# Patient Record
Sex: FEMALE | Race: WHITE | NOT HISPANIC OR LATINO | Employment: FULL TIME | URBAN - METROPOLITAN AREA
[De-identification: names, ages, dates, MRNs, and addresses within clinical notes are randomized per-mention and may not be internally consistent; named-entity substitution may affect disease eponyms.]

---

## 2017-01-09 ENCOUNTER — TRANSCRIBE ORDERS (OUTPATIENT)
Dept: ADMINISTRATIVE | Facility: HOSPITAL | Age: 47
End: 2017-01-09

## 2017-01-09 DIAGNOSIS — Z12.31 VISIT FOR SCREENING MAMMOGRAM: Primary | ICD-10-CM

## 2017-04-06 ENCOUNTER — APPOINTMENT (OUTPATIENT)
Dept: MAMMOGRAPHY | Facility: HOSPITAL | Age: 47
End: 2017-04-06
Attending: OBSTETRICS & GYNECOLOGY

## 2017-04-11 ENCOUNTER — HOSPITAL ENCOUNTER (OUTPATIENT)
Dept: MAMMOGRAPHY | Facility: HOSPITAL | Age: 47
Discharge: HOME | End: 2017-04-11
Attending: OBSTETRICS & GYNECOLOGY | Admitting: OBSTETRICS & GYNECOLOGY

## 2017-04-11 DIAGNOSIS — Z12.31 VISIT FOR SCREENING MAMMOGRAM: ICD-10-CM

## 2017-04-11 PROCEDURE — 77063 BREAST TOMOSYNTHESIS BI: CPT

## 2017-04-11 PROCEDURE — 77067 SCR MAMMO BI INCL CAD: CPT | Performed by: RADIOLOGY

## 2017-04-11 PROCEDURE — 77063 BREAST TOMOSYNTHESIS BI: CPT | Performed by: RADIOLOGY

## 2017-04-11 PROCEDURE — G0202 SCR MAMMO BI INCL CAD: HCPCS

## 2017-08-29 ENCOUNTER — OFFICE VISIT (OUTPATIENT)
Dept: OBSTETRICS AND GYNECOLOGY | Facility: CLINIC | Age: 47
End: 2017-08-29

## 2017-08-29 VITALS
HEIGHT: 64 IN | SYSTOLIC BLOOD PRESSURE: 116 MMHG | WEIGHT: 160 LBS | DIASTOLIC BLOOD PRESSURE: 70 MMHG | BODY MASS INDEX: 27.31 KG/M2

## 2017-08-29 DIAGNOSIS — Z01.419 WOMEN'S ANNUAL ROUTINE GYNECOLOGICAL EXAMINATION: Primary | ICD-10-CM

## 2017-08-29 PROBLEM — Z98.890 S/P ABDOMINOPLASTY: Status: ACTIVE | Noted: 2017-08-29

## 2017-08-29 PROBLEM — Z98.891 S/P CESAREAN SECTION: Status: ACTIVE | Noted: 2017-08-29

## 2017-08-29 PROCEDURE — 99396 PREV VISIT EST AGE 40-64: CPT | Performed by: OBSTETRICS & GYNECOLOGY

## 2017-08-29 RX ORDER — MULTIPLE VITAMINS W/ MINERALS TAB 9MG-400MCG
1 TAB ORAL DAILY
COMMUNITY

## 2017-08-29 NOTE — PROGRESS NOTES
"Subjective   Chief Complaint   Patient presents with   • Annual Exam     Nellie Camacho is a 46 y.o. year old  presenting to be seen for her annual exam.    Current birth control method: tubal ligation.She is doing well.  Her son Eduard's on the academic team and playing golf.  Now 11 years old.    Patient's last menstrual period was 2017.    She is sexually active.   Condoms are not typically used.      Past 6 month menstrual history:    Cycle Frequency: vary between 26 and 28 days   Menstrual cycle character: flow is typically normal   Cycle Duration: 4 - 6   Number of heavy days of flows: 2   Intermenstrual bleeding present: {no   Post-coital bleeding present: no     She exercises regularly: yes.  Calcium intake: no.Yogurt some cheese occasional milk multiple vitamin she is doing well.  Eduard is playing golf is on condem[place] [employer].  She performs self breast exam:yes.  She has concerns about domestic violence: no.    The following portions of the patient's history were reviewed and updated as appropriate:problem list, current medications, allergies, past family history, past medical history, past social history and past surgical history.  Torie answered no to all questions on gynecologic history.  Her medical history review was negative system review is negative except for headaches.  Family history is negative for breast ovarian and colon cancer no osteoporosis.  She is  does not smoke nor drink no illegal drugs.    Review of Systems    normal bladder and bowels  Objective   /70  Ht 63.5\" (161.3 cm)  Wt 160 lb (72.6 kg)  LMP 2017 Comment: 26 cycle days  BMI 27.9 kg/m2     General:  well developed; well nourished  no acute distress  appears stated age   Skin:  No suspicious lesions seen   Thyroid: normal to inspection and palpation   Breasts:  Examined in supine position  Symmetric without masses or skin dimpling  Nipples normal without inversion, lesions or discharge  There are " no palpable axillary nodes   Abdomen: soft, non-tender; no masses  no umbilical or inginual hernias are present  no hepato-splenomegaly   Pelvis: Clinical staff was present for exam  External genitalia:  normal appearance of the external genitalia including Bartholin's and [place]'s glands.  :  urethral meatus normal;  Uterus:  normal size, shape and consistency.  Adnexa:  normal bimanual exam of the adnexa.  Rectal:  digital rectal exam not performed; anus visually normal appearing.     Lab Review   No data reviewed    Imaging  Mammogram report       Assessment   1. Normal GYN examination  2. May need a little more calcium in her diet to prevent osteoporosis  3. Normal mammogram April 2017  4. Normal Pap co-test May 2016     Plan   1. Continue self breast awareness, exercise  We'll give calcium diet information  If she would like she could schedule her mammogram on the same day of her appointment next year.    Medications Rx this encounter:  New Medications Ordered This Visit   Medications   • Multiple Vitamins-Minerals (MULTIVITAMIN WITH MINERALS) tablet tablet     Sig: Take 1 tablet by mouth Daily.          This note was electronically signed.    Bob Su MD  8/29/2017

## 2018-03-05 ENCOUNTER — TRANSCRIBE ORDERS (OUTPATIENT)
Dept: OBSTETRICS AND GYNECOLOGY | Facility: CLINIC | Age: 48
End: 2018-03-05

## 2018-03-05 ENCOUNTER — TRANSCRIBE ORDERS (OUTPATIENT)
Dept: ADMINISTRATIVE | Facility: HOSPITAL | Age: 48
End: 2018-03-05

## 2018-03-05 DIAGNOSIS — Z12.31 VISIT FOR SCREENING MAMMOGRAM: Primary | ICD-10-CM

## 2018-04-24 ENCOUNTER — HOSPITAL ENCOUNTER (OUTPATIENT)
Dept: MAMMOGRAPHY | Facility: HOSPITAL | Age: 48
Discharge: HOME | End: 2018-04-24
Attending: OBSTETRICS & GYNECOLOGY | Admitting: OBSTETRICS & GYNECOLOGY

## 2018-04-24 DIAGNOSIS — Z12.31 VISIT FOR SCREENING MAMMOGRAM: ICD-10-CM

## 2018-04-24 PROCEDURE — 77067 SCR MAMMO BI INCL CAD: CPT | Performed by: RADIOLOGY

## 2018-04-24 PROCEDURE — 77063 BREAST TOMOSYNTHESIS BI: CPT | Performed by: RADIOLOGY

## 2018-04-24 PROCEDURE — 77063 BREAST TOMOSYNTHESIS BI: CPT

## 2018-04-24 PROCEDURE — 77067 SCR MAMMO BI INCL CAD: CPT

## 2019-03-27 ENCOUNTER — TRANSCRIBE ORDERS (OUTPATIENT)
Dept: ADMINISTRATIVE | Facility: HOSPITAL | Age: 49
End: 2019-03-27

## 2019-03-27 DIAGNOSIS — Z12.31 VISIT FOR SCREENING MAMMOGRAM: Primary | ICD-10-CM

## 2019-04-22 ENCOUNTER — OFFICE VISIT (OUTPATIENT)
Dept: OBSTETRICS AND GYNECOLOGY | Facility: CLINIC | Age: 49
End: 2019-04-22

## 2019-04-22 VITALS
HEIGHT: 65 IN | SYSTOLIC BLOOD PRESSURE: 116 MMHG | WEIGHT: 170 LBS | BODY MASS INDEX: 28.32 KG/M2 | DIASTOLIC BLOOD PRESSURE: 70 MMHG

## 2019-04-22 DIAGNOSIS — Z01.419 ENCOUNTER FOR WELL WOMAN EXAM WITH ROUTINE GYNECOLOGICAL EXAM: Primary | ICD-10-CM

## 2019-04-22 PROBLEM — Z98.891 S/P CESAREAN SECTION: Status: RESOLVED | Noted: 2017-08-29 | Resolved: 2019-04-22

## 2019-04-22 PROCEDURE — 99396 PREV VISIT EST AGE 40-64: CPT | Performed by: OBSTETRICS & GYNECOLOGY

## 2019-04-22 NOTE — PROGRESS NOTES
"Subjective   Chief Complaint   Patient presents with   • Annual Exam     Nellie Camacho is a 48 y.o. year old  presenting to be seen for her annual exam.    Current birth control method: Tubal / salpingectomy.  She is noting a lot of warts on legs and left upper arm; right hand were raised - Compound W looks like it is working ; rough skin not raised ?    Her son Larry is doing well and 1 the regional science fair.    Patient's last menstrual period was 04/15/2019.    She is sexually active.   Condoms are not typically used.    [place] is painful or she is having problems :no  She has concerns about domestic violence: no.    Cycle Frequency: regular, predictable and consistent every 28 - 32 days   Menstrual cycle character: flow is typically normal and flow has been heavy some months and light other months   Cycle Duration: 3 - 5--7 mostly light or spotting   Number of heavy days of flows: 3   Intermenstrual bleeding present: no   Post-coital bleeding present: no     She exercises regularly: yes.  Calcium intake: is not adequate.2  Caffeine intake: 3 cups of coffee or equivalent   Self breast awareness:yes.      Social History    Tobacco Use      Smoking status: Never Smoker      Smokeless tobacco: Never Used    Social History     Substance and Sexual Activity   Alcohol Use No    does not drink    The following portions of the patient's history were reviewed and updated as is  appropriate:problem list, current medications, allergies, past family history, past medical history, past social history and past surgical history.    Current Outpatient Medications:   •  Multiple Vitamins-Minerals (MULTIVITAMIN WITH MINERALS) tablet tablet, Take 1 tablet by mouth Daily., Disp: , Rfl:     Review of Systems    normal bladder and bowels  Objective   /70   Ht 164.5 cm (64.75\")   Wt 77.1 kg (170 lb)   LMP 04/15/2019 Comment: btl  Breastfeeding? No   BMI 28.51 kg/m²       General:  well developed; well " nourished  no acute distress  appears stated age   Skin:  No suspicious lesions seen  She has multiple Band-Aids in multiple areas were Compound W type medication has been applied.   Thyroid:    Breasts:  Examined in supine position  Symmetric without masses or skin dimpling  Nipples normal without inversion, lesions or discharge  There are no palpable axillary nodes   Abdomen: soft, non-tender; no masses  no umbilical or inguinal hernias are present  no hepato-splenomegaly   Pelvis: Clinical staff was present for exam  External genitalia:  normal appearance of the external genitalia including Bartholin's and [place]'s glands. There is a benign-appearing mole left lower lateral vulva/buttocks  :  urethral meatus normal; urethral hypermobility is absent.  Vaginal:  normal pink mucosa without prolapse or lesions.  Cervix:  normal appearance. Pap obtained  Uterus:  normal size, shape and consistency.  Adnexa:  normal bimanual exam of the adnexa.       Lab Review   Pap test    Imaging  Mammogram report       Assessment   1. Normal GYN examination.  Pap co-test and caught up-to-date.  Mammogram will be scheduled or done soon after April 24.  2. Consider colonoscopy with new protocol.  3. Decrease caffeine and increase calcium ideally she is exercising.  4. I will defer to primary care doctor regarding whether these are warts or not; if flap wonder if it is some sort of eczema psoriasis sort of lesion.  But not over treat with Compound W.               Plan   1. 1000 mg calcium in divided doses ideally in diet; regular exercise  2. Self breast awareness and mammogram protocols discussed.  3. Annual examination or sooner as needed         No orders of the defined types were placed in this encounter.    No orders of the defined types were placed in this encounter.          This note was electronically signed.    Bob Su MD  4/22/2019

## 2019-05-07 ENCOUNTER — HOSPITAL ENCOUNTER (OUTPATIENT)
Dept: MAMMOGRAPHY | Facility: HOSPITAL | Age: 49
Discharge: HOME | End: 2019-05-07
Admitting: OBSTETRICS & GYNECOLOGY

## 2019-05-07 DIAGNOSIS — Z12.31 VISIT FOR SCREENING MAMMOGRAM: ICD-10-CM

## 2019-05-07 PROCEDURE — 77067 SCR MAMMO BI INCL CAD: CPT

## 2019-05-07 PROCEDURE — 77067 SCR MAMMO BI INCL CAD: CPT | Performed by: RADIOLOGY

## 2019-05-07 PROCEDURE — 77063 BREAST TOMOSYNTHESIS BI: CPT

## 2019-05-07 PROCEDURE — 77063 BREAST TOMOSYNTHESIS BI: CPT | Performed by: RADIOLOGY

## 2020-04-27 ENCOUNTER — TRANSCRIBE ORDERS (OUTPATIENT)
Dept: ADMINISTRATIVE | Facility: HOSPITAL | Age: 50
End: 2020-04-27

## 2020-04-27 DIAGNOSIS — Z12.31 VISIT FOR SCREENING MAMMOGRAM: Primary | ICD-10-CM

## 2020-08-05 ENCOUNTER — APPOINTMENT (OUTPATIENT)
Dept: MAMMOGRAPHY | Facility: HOSPITAL | Age: 50
End: 2020-08-05

## 2020-08-11 ENCOUNTER — OFFICE VISIT (OUTPATIENT)
Dept: OBSTETRICS AND GYNECOLOGY | Facility: CLINIC | Age: 50
End: 2020-08-11

## 2020-08-11 VITALS
WEIGHT: 166 LBS | HEIGHT: 65 IN | SYSTOLIC BLOOD PRESSURE: 116 MMHG | DIASTOLIC BLOOD PRESSURE: 70 MMHG | BODY MASS INDEX: 27.66 KG/M2

## 2020-08-11 DIAGNOSIS — Z01.419 ENCOUNTER FOR GYNECOLOGICAL EXAMINATION WITHOUT ABNORMAL FINDING: Primary | ICD-10-CM

## 2020-08-11 PROBLEM — G43.829 MENSTRUAL MIGRAINE: Status: ACTIVE | Noted: 2020-08-11

## 2020-08-11 PROBLEM — Z98.890 S/P ABDOMINOPLASTY: Status: RESOLVED | Noted: 2017-08-29 | Resolved: 2020-08-11

## 2020-08-11 PROCEDURE — 99396 PREV VISIT EST AGE 40-64: CPT | Performed by: OBSTETRICS & GYNECOLOGY

## 2020-08-11 RX ORDER — ONDANSETRON 4 MG/1
4 TABLET, ORALLY DISINTEGRATING ORAL EVERY 8 HOURS PRN
Qty: 20 TABLET | Refills: 1 | Status: SHIPPED | OUTPATIENT
Start: 2020-08-11 | End: 2021-10-29

## 2020-08-11 NOTE — PROGRESS NOTES
Subjective   Chief Complaint   Patient presents with   • Annual Exam     Nellie Camacho is a 49 y.o. year old  presenting to be seen for her annual exam.    Current birth control method: Tubal / salpingectomy.    Patient's last menstrual period was 2020.    She is sexually active.   Condoms are not typically used.    [place] is painful or she is having problems :no  She has concerns about domestic violence: no.    Cycle Frequency: regular, predictable and consistent every 28 - 32 days   Menstrual cycle character: flow is typically normal and flow is typically moderately heavy   Cycle Duration: 3 - 5   Number of heavy days of flows: 2   Intermenstrual bleeding present: no   Post-coital bleeding present: no     She exercises regularly: yes.  Self breast awareness:yes    Calcium intake: is not adequate.  Caffeine intake: no or mild caffeine use  Social History    Tobacco Use      Smoking status: Never Smoker      Smokeless tobacco: Never Used    Social History     Substance and Sexual Activity   Alcohol Use No        The following portions of the patient's history were reviewed and updated as is  appropriate:problem list, current medications, allergies, past family history, past medical history, past social history and past surgical history.    Current Outpatient Medications:   •  Multiple Vitamins-Minerals (MULTIVITAMIN WITH MINERALS) tablet tablet, Take 1 tablet by mouth Daily., Disp: , Rfl:   •  ondansetron ODT (Zofran ODT) 4 MG disintegrating tablet, Place 1 tablet on the tongue Every 8 (Eight) Hours As Needed for Nausea or Vomiting., Disp: 20 tablet, Rfl: 1    Review of systems  Constitutional    POS headaches                             NEG anorexia, fatigue, fevers or night sweats  Breast                POS nothing reported                            NEG persistent breast lump, skin dimpling or nipple discharge  GI                      POS nothing reported                            NEG  "bloating, change in bowel habits, melena or reflux symptoms                       POS nothing reported                            NEG dysuria or hematuria         Objective   /70   Ht 163.8 cm (64.5\")   Wt 75.3 kg (166 lb)   LMP 07/22/2020   Breastfeeding No   BMI 28.05 kg/m²       General:  well developed; well nourished  no acute distress  appears stated age   Skin:  No suspicious lesions seen   Thyroid:    Breasts:  Examined in supine position  Symmetric without masses or skin dimpling  Nipples normal without inversion, lesions or discharge  Fibrocystic changes are present both breasts without a discrete mass   Abdomen: soft, non-tender; no masses  no umbilical or inguinal hernias are present  no hepato-splenomegaly   Pelvis: Clinical staff was present for exam  External genitalia:  normal appearance of the external genitalia including Bartholin's and [place]'s glands.  :  urethral meatus normal;  Vaginal:  normal pink mucosa without prolapse or lesions.  Uterus:  normal size, shape and consistency. anteverted;  Adnexa:  normal bimanual exam of the adnexa.  Rectal:  digital rectal exam not performed; anus visually normal appearing.       Lab Review   Pap test    Imaging  Mammogram report       Assessment     1. Normal GYN examination with exception of  2. What sounds like menstrual back respiratory control with Tylenol and Motrin if she takes this early occasional nausea vomiting so we will add Zofran  She is up-to-date with mammogram scheduled delayed due to COVID virus Pap smear normal last year           Plan     1. Annual examination or sooner as needed  2. 1000 mg calcium in divided doses ideally in diet; regular exercise  3. Self breast awareness discussed  4.    She will lab work done when she returns about 2 weeks for mammogram            New Medications Ordered This Visit   Medications   • ondansetron ODT (Zofran ODT) 4 MG disintegrating tablet     Sig: Place 1 tablet on the tongue Every 8 " (Eight) Hours As Needed for Nausea or Vomiting.     Dispense:  20 tablet     Refill:  1     Orders Placed This Encounter   Procedures   • Lipid Panel     Standing Status:   Future     Standing Expiration Date:   8/11/2021   • TSH     Standing Status:   Future     Standing Expiration Date:   8/11/2021   • CBC (No Diff)     Standing Status:   Future     Standing Expiration Date:   8/11/2021           This note was electronically signed.    Bob Su MD  8/11/2020

## 2020-08-20 ENCOUNTER — LAB (OUTPATIENT)
Dept: LAB | Facility: HOSPITAL | Age: 50
End: 2020-08-20

## 2020-08-20 ENCOUNTER — HOSPITAL ENCOUNTER (OUTPATIENT)
Dept: MAMMOGRAPHY | Facility: HOSPITAL | Age: 50
Discharge: HOME | End: 2020-08-20
Admitting: OBSTETRICS & GYNECOLOGY

## 2020-08-20 DIAGNOSIS — Z12.31 VISIT FOR SCREENING MAMMOGRAM: ICD-10-CM

## 2020-08-20 DIAGNOSIS — Z01.419 ENCOUNTER FOR GYNECOLOGICAL EXAMINATION WITHOUT ABNORMAL FINDING: ICD-10-CM

## 2020-08-20 LAB
CHOLEST SERPL-MCNC: 194 MG/DL (ref 0–200)
DEPRECATED RDW RBC AUTO: 40.7 FL (ref 37–54)
ERYTHROCYTE [DISTWIDTH] IN BLOOD BY AUTOMATED COUNT: 12.7 % (ref 12.3–15.4)
HCT VFR BLD AUTO: 40 % (ref 34–46.6)
HDLC SERPL-MCNC: 61 MG/DL (ref 40–60)
HGB BLD-MCNC: 13.4 G/DL (ref 12–15.9)
LDLC SERPL CALC-MCNC: 115 MG/DL (ref 0–100)
LDLC/HDLC SERPL: 1.89 {RATIO}
MCH RBC QN AUTO: 29.4 PG (ref 26.6–33)
MCHC RBC AUTO-ENTMCNC: 33.5 G/DL (ref 31.5–35.7)
MCV RBC AUTO: 87.7 FL (ref 79–97)
PLATELET # BLD AUTO: 230 10*3/MM3 (ref 140–450)
PMV BLD AUTO: 10.3 FL (ref 6–12)
RBC # BLD AUTO: 4.56 10*6/MM3 (ref 3.77–5.28)
TRIGL SERPL-MCNC: 90 MG/DL (ref 0–150)
TSH SERPL DL<=0.05 MIU/L-ACNC: 1.17 UIU/ML (ref 0.27–4.2)
VLDLC SERPL-MCNC: 18 MG/DL (ref 5–40)
WBC # BLD AUTO: 5.83 10*3/MM3 (ref 3.4–10.8)

## 2020-08-20 PROCEDURE — 77067 SCR MAMMO BI INCL CAD: CPT

## 2020-08-20 PROCEDURE — 77067 SCR MAMMO BI INCL CAD: CPT | Performed by: RADIOLOGY

## 2020-08-20 PROCEDURE — 84443 ASSAY THYROID STIM HORMONE: CPT

## 2020-08-20 PROCEDURE — 36415 COLL VENOUS BLD VENIPUNCTURE: CPT

## 2020-08-20 PROCEDURE — 80061 LIPID PANEL: CPT

## 2020-08-20 PROCEDURE — 77063 BREAST TOMOSYNTHESIS BI: CPT | Performed by: RADIOLOGY

## 2020-08-20 PROCEDURE — 77063 BREAST TOMOSYNTHESIS BI: CPT

## 2020-08-20 PROCEDURE — 85027 COMPLETE CBC AUTOMATED: CPT

## 2021-10-29 ENCOUNTER — OFFICE VISIT (OUTPATIENT)
Dept: OBSTETRICS AND GYNECOLOGY | Facility: CLINIC | Age: 51
End: 2021-10-29

## 2021-10-29 VITALS
SYSTOLIC BLOOD PRESSURE: 118 MMHG | DIASTOLIC BLOOD PRESSURE: 70 MMHG | BODY MASS INDEX: 30.08 KG/M2 | RESPIRATION RATE: 16 BRPM | WEIGHT: 178 LBS

## 2021-10-29 DIAGNOSIS — Z12.31 ENCOUNTER FOR SCREENING MAMMOGRAM FOR MALIGNANT NEOPLASM OF BREAST: ICD-10-CM

## 2021-10-29 DIAGNOSIS — Z01.419 ENCOUNTER FOR GYNECOLOGICAL EXAMINATION WITHOUT ABNORMAL FINDING: Primary | ICD-10-CM

## 2021-10-29 PROCEDURE — 99396 PREV VISIT EST AGE 40-64: CPT | Performed by: NURSE PRACTITIONER

## 2021-10-29 RX ORDER — ERGOCALCIFEROL 1.25 MG/1
CAPSULE ORAL
COMMUNITY
Start: 2021-10-25

## 2021-10-29 NOTE — PROGRESS NOTES
Annual Visit     Patient Name: Nellie Camacho  : 1970   MRN: [id number]   Care Team: Patient Care Team:  Jennifer Saldivar MD as PCP - General (Family Medicine)    Chief Complaint:    Chief Complaint   Patient presents with   • Annual Exam       HPI: Nellie Camacho is a 50 y.o. year old  presenting to be seen for her gynecologic exam.   S/p tubal/salpingectomy   Monthly periods w/out c/o     Pap smear  WNL and HPV negative     Mammogram 2020 birads 1   Hasn't scheduled imaging for this year yet     Has been struggling to lose weight - exercises 5-6x/wk and monitoring intake with my fitness pal and still no weight loss   Had labs recently with PCP including TSH which was WNL per pt       Subjective      /70   Resp 16   Wt 80.7 kg (178 lb)   LMP 10/18/2021   Breastfeeding No   BMI 30.08 kg/m²     BMI reviewed: Body mass index is 30.08 kg/m².      Objective     Physical Exam    Neuro: alert and oriented to person, place and time   General:  alert; cooperative; well developed; well nourished   Skin:  No suspicious lesions seen   Thyroid: normal to inspection and palpation   Lungs:  breathing is unlabored  clear to auscultation bilaterally   Heart:  regular rate and rhythm, S1, S2 normal, no murmur, click, rub or gallop  normal apical impulse   Breasts:  Examined in supine position  Symmetric without masses or skin dimpling  Nipples normal without inversion, lesions or discharge  There are no palpable axillary nodes   Abdomen: soft, non-tender; no masses  no umbilical or inguinal hernias are present  no hepato-splenomegaly   Pelvis: Clinical staff was present for exam  External genitalia:  normal appearance of the external genitalia including Bartholin's and [place]'s glands.  :  urethral meatus normal;  Vaginal:  normal pink mucosa without prolapse or lesions.  Cervix:  normal appearance.  Uterus:  normal size, shape and consistency.  Adnexa:  non palpable bilaterally.  Rectal:   digital rectal exam not performed; anus visually normal appearing.         Assessment / Plan      Assessment  Problems Addressed This Visit    ICD-10-CM ICD-9-CM   1. Encounter for gynecological examination without abnormal finding  Z01.419 V72.31   2. Encounter for screening mammogram for malignant neoplasm of breast  Z12.31 V76.12       Plan    Discussed monthly SBEs and importance of annual imaging   Mammogram ordered     Pap smear not indicated today     Discussed cont with monitoring intake and exercise   Cont routine labs with PCP     AV 1 yr             Follow Up  Return in about 1 year (around 10/29/2022) for Annual physical.  Patient was given instructions and counseling regarding her condition or for health maintenance advice. Please see specific information pulled into the AVS if appropriate.     Michelle Adame, APRGILBERT  October 29, 2021  15:31 EDT

## 2021-11-18 ENCOUNTER — TRANSCRIBE ORDERS (OUTPATIENT)
Dept: ADMINISTRATIVE | Facility: HOSPITAL | Age: 51
End: 2021-11-18

## 2021-11-18 DIAGNOSIS — Z12.31 VISIT FOR SCREENING MAMMOGRAM: Primary | ICD-10-CM

## 2021-11-22 ENCOUNTER — HOSPITAL ENCOUNTER (OUTPATIENT)
Dept: MAMMOGRAPHY | Facility: HOSPITAL | Age: 51
Discharge: HOME | End: 2021-11-22
Admitting: NURSE PRACTITIONER

## 2021-11-22 DIAGNOSIS — Z12.31 VISIT FOR SCREENING MAMMOGRAM: ICD-10-CM

## 2021-11-22 PROCEDURE — 77063 BREAST TOMOSYNTHESIS BI: CPT | Performed by: RADIOLOGY

## 2021-11-22 PROCEDURE — 77063 BREAST TOMOSYNTHESIS BI: CPT

## 2021-11-22 PROCEDURE — 77067 SCR MAMMO BI INCL CAD: CPT

## 2021-11-22 PROCEDURE — 77067 SCR MAMMO BI INCL CAD: CPT | Performed by: RADIOLOGY

## 2022-07-18 ENCOUNTER — TRANSCRIBE ORDERS (OUTPATIENT)
Dept: OBSTETRICS AND GYNECOLOGY | Facility: CLINIC | Age: 52
End: 2022-07-18

## 2022-07-18 DIAGNOSIS — Z12.31 VISIT FOR SCREENING MAMMOGRAM: Primary | ICD-10-CM

## 2022-11-23 ENCOUNTER — HOSPITAL ENCOUNTER (OUTPATIENT)
Dept: MAMMOGRAPHY | Facility: HOSPITAL | Age: 52
Discharge: HOME | End: 2022-11-23
Admitting: NURSE PRACTITIONER

## 2022-11-23 ENCOUNTER — OFFICE VISIT (OUTPATIENT)
Dept: OBSTETRICS AND GYNECOLOGY | Facility: CLINIC | Age: 52
End: 2022-11-23

## 2022-11-23 VITALS
RESPIRATION RATE: 16 BRPM | SYSTOLIC BLOOD PRESSURE: 118 MMHG | DIASTOLIC BLOOD PRESSURE: 70 MMHG | WEIGHT: 180 LBS | BODY MASS INDEX: 30.42 KG/M2

## 2022-11-23 DIAGNOSIS — Z01.419 ENCOUNTER FOR GYNECOLOGICAL EXAMINATION WITHOUT ABNORMAL FINDING: Primary | ICD-10-CM

## 2022-11-23 DIAGNOSIS — Z12.31 VISIT FOR SCREENING MAMMOGRAM: ICD-10-CM

## 2022-11-23 DIAGNOSIS — Z12.4 PAP SMEAR FOR CERVICAL CANCER SCREENING: ICD-10-CM

## 2022-11-23 DIAGNOSIS — N60.12 FIBROCYSTIC BREAST CHANGES, BILATERAL: ICD-10-CM

## 2022-11-23 DIAGNOSIS — Z12.11 SCREENING FOR MALIGNANT NEOPLASM OF COLON: ICD-10-CM

## 2022-11-23 DIAGNOSIS — N60.11 FIBROCYSTIC BREAST CHANGES, BILATERAL: ICD-10-CM

## 2022-11-23 PROCEDURE — 77067 SCR MAMMO BI INCL CAD: CPT | Performed by: RADIOLOGY

## 2022-11-23 PROCEDURE — 99396 PREV VISIT EST AGE 40-64: CPT | Performed by: NURSE PRACTITIONER

## 2022-11-23 PROCEDURE — 77063 BREAST TOMOSYNTHESIS BI: CPT | Performed by: RADIOLOGY

## 2022-11-23 PROCEDURE — 77067 SCR MAMMO BI INCL CAD: CPT

## 2022-11-23 PROCEDURE — 77063 BREAST TOMOSYNTHESIS BI: CPT

## 2022-11-23 RX ORDER — HYDROCHLOROTHIAZIDE 12.5 MG/1
TABLET ORAL
COMMUNITY
Start: 2022-11-15

## 2022-11-23 NOTE — PROGRESS NOTES
Annual Visit     Patient Name: Nellie Camacho  : 1970   MRN: [id number]   Care Team: Patient Care Team:  Jennifer Saldivar MD as PCP - General (Family Medicine)    Chief Complaint:    Chief Complaint   Patient presents with   • Annual Exam       HPI: Nellie Camacho is a 52 y.o. year old  presenting to be seen for her gynecologic exam.   S/p tubal/salpingectomy   She did miss one period this past summer   Otherwise periods have been monthly   No bothersome menopausal sx      Pap smear 2019 WNL and HPV negative      Mammogram 2021 birads 1   Had appt today      Hasn't had colon screening yet     Still working hard on diet and exercise and wt loss   Has labs with PCP including TSH       Subjective      I have reviewed the patients family history, social history, past medical history, past surgical history and have updated it as appropriate.    /70   Resp 16   Wt 81.6 kg (180 lb)   LMP 2022   BMI 30.42 kg/m²     BMI reviewed: Body mass index is 30.42 kg/m².      Objective     Physical Exam    Neuro: alert and oriented to person, place and time   General:  alert; cooperative; well developed; well nourished   Skin:  No suspicious lesions seen   Thyroid: normal to inspection and palpation   Lungs:  breathing is unlabored  clear to auscultation bilaterally   Heart:  regular rate and rhythm, S1, S2 normal, no murmur, click, rub or gallop  normal apical impulse   Breasts:  Examined in supine position  Symmetric without masses or skin dimpling  Nipples normal without inversion, lesions or discharge  There are no palpable axillary nodes  Fibrocystic changes are present both breasts without a discrete mass   Abdomen: soft, non-tender; no masses  no umbilical or inguinal hernias are present  no hepato-splenomegaly   Pelvis: Clinical staff was present for exam  External genitalia:  normal appearance of the external genitalia including Bartholin's and [place]'s glands.  :  urethral meatus  normal;  Vaginal:  normal pink mucosa without prolapse or lesions.  Cervix:  normal appearance.  Uterus:  normal size, shape and consistency.  Adnexa:  normal bimanual exam of the adnexa.  Rectal:  digital rectal exam not performed; anus visually normal appearing.         Assessment / Plan      Assessment  Problems Addressed This Visit    ICD-10-CM ICD-9-CM   1. Encounter for gynecological examination without abnormal finding  Z01.419 V72.31   2. Pap smear for cervical cancer screening  Z12.4 V76.2   3. Fibrocystic breast changes, bilateral  N60.11 610.1    N60.12    4. Screening for malignant neoplasm of colon  Z12.11 V76.51       Plan    Discussed monthly SBEs and importance of annual imaging   Mammogram not finalized yet   Discussed fibrocystic breast changes      Pap smear pending      Discussed cont with monitoring intake and exercise   Cont routine labs with PCP     Cologuard ordered - no family hx of colon cancer and no dark/tarry or bloody stools      AV 1 yr         40 to 64: Counseling/Anticipatory Guidance Discussed: screenings and self-breast exam    Follow Up  Return in about 1 year (around 11/23/2023) for Annual physical.  Patient was given instructions and counseling regarding her condition or for health maintenance advice. Please see specific information pulled into the AVS if appropriate.     Michelle Adame, APRN  November 23, 2022  13:42 EST

## 2022-12-02 LAB — REF LAB TEST METHOD: NORMAL

## 2022-12-20 ENCOUNTER — APPOINTMENT (OUTPATIENT)
Dept: MAMMOGRAPHY | Facility: HOSPITAL | Age: 52
End: 2022-12-20

## 2023-08-11 ENCOUNTER — TRANSCRIBE ORDERS (OUTPATIENT)
Dept: ADMINISTRATIVE | Facility: HOSPITAL | Age: 53
End: 2023-08-11
Payer: COMMERCIAL

## 2023-08-11 DIAGNOSIS — Z12.31 SCREENING MAMMOGRAM, ENCOUNTER FOR: Primary | ICD-10-CM

## 2023-11-24 ENCOUNTER — HOSPITAL ENCOUNTER (OUTPATIENT)
Dept: MAMMOGRAPHY | Facility: HOSPITAL | Age: 53
Discharge: HOME | End: 2023-11-24
Admitting: NURSE PRACTITIONER
Payer: COMMERCIAL

## 2023-11-24 DIAGNOSIS — Z12.31 SCREENING MAMMOGRAM, ENCOUNTER FOR: ICD-10-CM

## 2023-11-24 PROCEDURE — 77063 BREAST TOMOSYNTHESIS BI: CPT

## 2023-11-24 PROCEDURE — 77067 SCR MAMMO BI INCL CAD: CPT

## 2024-01-23 ENCOUNTER — OFFICE VISIT (OUTPATIENT)
Dept: OBSTETRICS AND GYNECOLOGY | Facility: CLINIC | Age: 54
End: 2024-01-23
Payer: COMMERCIAL

## 2024-01-23 VITALS
BODY MASS INDEX: 24.99 KG/M2 | DIASTOLIC BLOOD PRESSURE: 80 MMHG | HEIGHT: 65 IN | WEIGHT: 150 LBS | SYSTOLIC BLOOD PRESSURE: 118 MMHG

## 2024-01-23 DIAGNOSIS — N93.9 ABNORMAL UTERINE BLEEDING (AUB): ICD-10-CM

## 2024-01-23 DIAGNOSIS — Z01.411 ENCOUNTER FOR GYNECOLOGICAL EXAMINATION WITH ABNORMAL FINDING: Primary | ICD-10-CM

## 2024-01-23 RX ORDER — SEMAGLUTIDE 1 MG/.5ML
1 INJECTION, SOLUTION SUBCUTANEOUS WEEKLY
COMMUNITY
Start: 2023-12-09

## 2024-01-23 NOTE — PROGRESS NOTES
"Chief Complaint  Nellie Camacho is a 53 y.o.  female presenting for Annual Exam, Results (Patient brought copies of recent lab results with her today.), and Menstrual Problem (Irregular menses.  C/O mid-cycle spotting (only sees when she wipes).)    History of Present Illness  Nellie is a very pleasant 54yo woman, , here today for annual gyn exam, and a bldg problem.  Her surgical history includes, in part, a  section delivery with a tubal sterilization.  She c/o mid-cycle spotting (when she wipes) for 2-4 days, which has occurred only at mid-cycle x 2 months.    She is sexually active.  Notes some dryness, but no pain with SA / no postcoital bldg.  Menses otherwise, reg q month for 4-5 days of flow; on the heaviest day, will saturate a pad in ~ 4 hrs.    She has no severe dysmenorrhea / no pelvic pain.    Last pap 22 wnl, neg HPV  Mammogram 2023, neg  Cologuard 2022, neg    The following portions of the patient's history were reviewed and updated as appropriate: allergies, current medications, past family history, past medical history, past social history, past surgical history, and problem list.    No Known Allergies      Current Outpatient Medications:     Wegovy 1 MG/0.5ML solution auto-injector, Inject 0.5 mL as directed 1 (One) Time Per Week., Disp: , Rfl:     Multiple Vitamins-Minerals (MULTIVITAMIN WITH MINERALS) tablet tablet, Take 1 tablet by mouth Daily., Disp: , Rfl:     Past Medical History:   Diagnosis Date    Hypertension         Past Surgical History:   Procedure Laterality Date    ABDOMINOPLASTY  2008    tummy tuck    APPENDECTOMY  2020    emergent     SECTION WITH TUBAL Bilateral        Objective  /80   Ht 163.8 cm (64.5\")   Wt 68 kg (150 lb)   LMP 2024 (Exact Date)   Breastfeeding No   BMI 25.35 kg/m²     Physical Exam  Vitals and nursing note reviewed. Exam conducted with a chaperone present.   Constitutional:       " General: She is not in acute distress.     Appearance: Normal appearance. She is not ill-appearing.   HENT:      Head: Normocephalic.   Neck:      Thyroid: No thyroid mass or thyromegaly.   Cardiovascular:      Rate and Rhythm: Normal rate and regular rhythm.      Heart sounds: Normal heart sounds. No murmur heard.  Pulmonary:      Effort: Pulmonary effort is normal. No respiratory distress.      Breath sounds: Normal breath sounds.   Chest:   Breasts:     Right: No inverted nipple, mass or nipple discharge.      Left: No inverted nipple, mass or nipple discharge.   Abdominal:      Palpations: Abdomen is soft. There is no mass.      Tenderness: There is no abdominal tenderness.   Genitourinary:     General: Normal vulva.      Labia:         Right: No rash, tenderness or lesion.         Left: No rash, tenderness or lesion.       Vagina: Normal. No erythema.      Cervix: No discharge, lesion or erythema.      Uterus: Not enlarged and not tender.       Adnexa:         Right: No mass or tenderness.          Left: No mass or tenderness.        Comments: Anus appears wnl.  No rectal exam performed.  Lymphadenopathy:      Upper Body:      Right upper body: No supraclavicular or axillary adenopathy.      Left upper body: No supraclavicular or axillary adenopathy.   Skin:     General: Skin is warm and dry.   Neurological:      Mental Status: She is alert and oriented to person, place, and time.   Psychiatric:         Mood and Affect: Mood normal.         Behavior: Behavior normal.         Assessment/Plan   Diagnoses and all orders for this visit:    1. Encounter for gynecological examination with abnormal finding (Primary)    2. Abnormal uterine bleeding (AUB)  -     US Non-ob Transvaginal; Future        Procedures    40 to 64: Counseling/Anticipatory Guidance Discussed: nutrition, physical activity, screenings, and self-breast exam    Return in about 1 year (around 1/23/2025) for Annual physical ; sched pelvic US   (mid-cycle spotting).    Nellie Laird, APRN  01/23/2024

## 2024-02-06 ENCOUNTER — TELEPHONE (OUTPATIENT)
Dept: OBSTETRICS AND GYNECOLOGY | Facility: CLINIC | Age: 54
End: 2024-02-06
Payer: COMMERCIAL

## 2024-02-07 DIAGNOSIS — N95.0 PMB (POSTMENOPAUSAL BLEEDING): Primary | ICD-10-CM

## 2024-02-08 ENCOUNTER — TELEPHONE (OUTPATIENT)
Dept: OBSTETRICS AND GYNECOLOGY | Facility: CLINIC | Age: 54
End: 2024-02-08
Payer: COMMERCIAL

## 2024-02-20 ENCOUNTER — OFFICE VISIT (OUTPATIENT)
Dept: OBSTETRICS AND GYNECOLOGY | Facility: CLINIC | Age: 54
End: 2024-02-20
Payer: COMMERCIAL

## 2024-02-20 DIAGNOSIS — N93.9 ABNORMAL UTERINE BLEEDING (AUB): Primary | ICD-10-CM

## 2024-02-20 PROCEDURE — 58340 CATHETER FOR HYSTEROGRAPHY: CPT | Performed by: STUDENT IN AN ORGANIZED HEALTH CARE EDUCATION/TRAINING PROGRAM

## 2024-02-20 NOTE — PROGRESS NOTES
Saline Infusion Sonogram     Date of procedure:  February 20, 2024     Risks and benefits were discussed.  All questions were answered.  Consents given by the patient verbally.     Pre-op indication:  Menorrhagia  Suspected endometrial polyp     Procedure documentation:    After the patient was identified and informed consent given she was placed in dorsal lithotomy position in stirrups and draped. A sterile speculum was placed inside the vagina with good visualization of the cervix and the cervix was cleaned with Betadine swabs.  The cervix was grasped with a single-tooth tenaculum.  A balloon catheter was introduced through the cervix without complication and inflated. The speculum was removed. The uterine cavity was then evaluated with transvaginal ultrasonography while saline was being instilled.    The findings were as follows:  The bilayer endometrial stripe measured 3 mm.  Focal lesions were present    The balloon was then released and the cavity was then drained of saline and the catheter was removed. Scant bleeding was noted from the cervical lip and the procedure was completed. The patient tolerated the procedure well and was given follow-up instructions.      Impression: Endometrial polypoid disease is present   Plan: Hysteroscopic evalulation of the cavity with probable Myosure  Today I discussed with Nellie the risks of her upcoming hysteroscopy with possible Myosure resection of intracavitary pathology. Risks reviewed included intraoperative bleeding, infection at the site of surgery and damage to the adjacent surrounding organs. Additionally, the small risk for reoperation in the event of unanticipated bleeding or surgical injury was discussed.  Finally we discussed the risks of cerebral and/or pulmonary edema related to excess absorption of the distending fluid and the small chance the procedure could not be completed if there was an excessive mismatch of fluid infused & fluid recovered.  All of  her questions were answered fully.  She left with a very clear understanding of the preoperative surgical indications and the nature of the surgery for which she is scheduled.  She understands to be NPO after midnight and to be at the preoperative area ~ 1 hour prior to the scheduled surgical start time.     This note was electronically signed.    Tiffany Alonso MD   February 20, 2024

## 2024-03-29 ENCOUNTER — TELEPHONE (OUTPATIENT)
Dept: OBSTETRICS AND GYNECOLOGY | Facility: CLINIC | Age: 54
End: 2024-03-29
Payer: COMMERCIAL

## 2024-03-29 RX ORDER — MEDROXYPROGESTERONE ACETATE 10 MG/1
10 TABLET ORAL DAILY
Qty: 10 TABLET | Refills: 0 | Status: SHIPPED | OUTPATIENT
Start: 2024-03-29 | End: 2024-04-08

## 2024-03-29 NOTE — TELEPHONE ENCOUNTER
Pt is calling and and she is getting ready to have Dr. Alonso perform an endometrial polyp removal she stated. She is a [facility] pt. She is bleeding very heaving and was concerned. Pt is getting ready to be out of the [place] for a couple weeks and didn't know if she should reschedule the procedure or have it done sooner?    Please advise    Thank you

## 2024-03-29 NOTE — TELEPHONE ENCOUNTER
I probably can't get her in for the procedure any sooner, but I did call in a 10 day course of daily Provera to her listed pharmacy to help control her bleeding in the interim.     Tiffany Alonso MD

## 2024-03-29 NOTE — TELEPHONE ENCOUNTER
Patient advised the soonest appointment for surgery is 4/19/24 however Dr Alonso has called in prescription for Provera for 10 days to help with bleeding

## 2024-04-12 ENCOUNTER — TELEPHONE (OUTPATIENT)
Dept: OBSTETRICS AND GYNECOLOGY | Facility: CLINIC | Age: 54
End: 2024-04-12
Payer: COMMERCIAL

## 2024-04-12 NOTE — TELEPHONE ENCOUNTER
PT CALLED STATING THAT SHE WOULD LIKE TO SPEAK TO SOMEONE IN REGARDS TO HER PROCEDURE ON  NEXT FRIDAY

## 2024-04-18 ENCOUNTER — PREP FOR SURGERY (OUTPATIENT)
Dept: OTHER | Facility: HOSPITAL | Age: 54
End: 2024-04-18
Payer: COMMERCIAL

## 2024-04-18 NOTE — H&P
Nellie Camacho  : 1970  MRN: [id number]  CSN: [id number]    History and Physical    Subjective   Nellie Camacho is a 53 y.o. year old  who present for diagnostic hysteroscopy with anticipated Myosure due to AUB with suspected polyp appreciated on SIS.    Past Medical History:   Diagnosis Date    Hypertension      Past Surgical History:   Procedure Laterality Date     SECTION WITH TUBAL Bilateral     ABDOMINOPLASTY      tummy tuck    APPENDECTOMY  2020    emergent     OB History    Para Term  AB Living   1 1 1 0 0 1   SAB IAB Ectopic Molar Multiple Live Births   0 0 0 0 0 1      # Outcome Date GA Lbr Shamir/2nd Weight Sex Type Anes PTL Lv   1 Term    3742 g (8 lb 4 oz) M CS-LTranv   SEEMA      Name: Eduard     Social History    Tobacco Use      Smoking status: Never      Smokeless tobacco: Never      Current Outpatient Medications:     Multiple Vitamins-Minerals (MULTIVITAMIN WITH MINERALS) tablet tablet, Take 1 tablet by mouth Daily., Disp: , Rfl:     Wegovy 1 MG/0.5ML solution auto-injector, Inject 0.5 mL as directed 1 (One) Time Per Week., Disp: , Rfl:     No Known Allergies    Review of Systems   Genitourinary:  Positive for vaginal bleeding.   All other systems reviewed and are negative.        Objective     Vital Signs: See nursing documentation   General: well developed; well nourished  no acute distress   Mental status: Alert and oriented   Heart: Not performed.   Lungs: breathing is unlabored   Abdomen: soft, non-tender; no masses  no umbilical or inguinal hernias are present   Pelvis: Not performed.        Assessment   AUB  Endometrial polyp      Plan   Diagnostic hysteroscopy Myosure resection of intracavitary pathology  I have previously discussed with Nellie the risks of her hysteroscopy with possible Myosure resection of any intracavitary pathology. Risks discussed included intraoperative bleeding, infection at the site of surgery and damage  to the adjacent surrounding organs. Additionally, I explained the small risk for reoperation in the event of unanticipated bleeding or surgical injury.  Finally the risks of cerebral and/or pulmonary edema related to excess absorption of the distending fluid & the small chance the procedure could not be completed if there was an excessive mismatch of fluid infused vs. fluid recovered were explained.        Tiffany Alonso MD       (Pt's PCP is Jennifer Saldivar MD)

## 2024-04-19 ENCOUNTER — OUTSIDE FACILITY SERVICE (OUTPATIENT)
Dept: OBSTETRICS AND GYNECOLOGY | Facility: CLINIC | Age: 54
End: 2024-04-19
Payer: COMMERCIAL

## 2024-04-19 PROCEDURE — 88305 TISSUE EXAM BY PATHOLOGIST: CPT | Performed by: STUDENT IN AN ORGANIZED HEALTH CARE EDUCATION/TRAINING PROGRAM

## 2024-04-19 RX ORDER — DOCUSATE SODIUM 100 MG/1
100 CAPSULE, LIQUID FILLED ORAL 2 TIMES DAILY
Qty: 60 CAPSULE | Refills: 1 | Status: SHIPPED | OUTPATIENT
Start: 2024-04-19

## 2024-04-19 RX ORDER — IBUPROFEN 600 MG/1
600 TABLET ORAL EVERY 6 HOURS PRN
Qty: 60 TABLET | Refills: 1 | Status: SHIPPED | OUTPATIENT
Start: 2024-04-19

## 2024-04-20 ENCOUNTER — LAB REQUISITION (OUTPATIENT)
Dept: LAB | Facility: HOSPITAL | Age: 54
End: 2024-04-20
Payer: COMMERCIAL

## 2024-04-20 DIAGNOSIS — N93.9 ABNORMAL UTERINE AND VAGINAL BLEEDING, UNSPECIFIED: ICD-10-CM

## 2024-04-23 LAB
CYTO UR: NORMAL
LAB AP CASE REPORT: NORMAL
LAB AP CLINICAL INFORMATION: NORMAL
PATH REPORT.FINAL DX SPEC: NORMAL
PATH REPORT.GROSS SPEC: NORMAL

## 2024-05-07 ENCOUNTER — OFFICE VISIT (OUTPATIENT)
Dept: OBSTETRICS AND GYNECOLOGY | Facility: CLINIC | Age: 54
End: 2024-05-07
Payer: COMMERCIAL

## 2024-05-07 VITALS
BODY MASS INDEX: 25.32 KG/M2 | HEART RATE: 72 BPM | SYSTOLIC BLOOD PRESSURE: 104 MMHG | WEIGHT: 149.8 LBS | DIASTOLIC BLOOD PRESSURE: 64 MMHG

## 2024-05-07 DIAGNOSIS — Z98.890 STATUS POST HYSTEROSCOPY: Primary | ICD-10-CM

## 2024-05-07 PROCEDURE — 99024 POSTOP FOLLOW-UP VISIT: CPT | Performed by: STUDENT IN AN ORGANIZED HEALTH CARE EDUCATION/TRAINING PROGRAM

## 2024-06-25 ENCOUNTER — TELEPHONE (OUTPATIENT)
Dept: OBSTETRICS AND GYNECOLOGY | Facility: CLINIC | Age: 54
End: 2024-06-25
Payer: COMMERCIAL

## 2024-06-25 NOTE — TELEPHONE ENCOUNTER
Called and spoke with patient, informed her of advisement from Dr. Alonso, she verified understanding.

## 2024-06-25 NOTE — TELEPHONE ENCOUNTER
Pt had a cyst removed by Dr. Alonso in April and has not had a period since then.  Is this normal? She was more curious then concerned due to her age.    Please advise    Thank you

## 2024-06-25 NOTE — TELEPHONE ENCOUNTER
It can be based on her age. As she gets older, she will start to skip periods, and they become more infrequent. If she goes consecutive 12 months without a period, then she has completed menopause at that time.     Tiffany Alonso MD

## 2024-08-21 NOTE — PROGRESS NOTES
"Subjective   Chief Complaint   Patient presents with    Follow-up     Pt states that she went for her ovarian cancer screen last week and she stated that she was told one ovary is larger than the other one and she wants to discuss.     Nellie Camacho is a 53 y.o. year old .  Patient's last menstrual period was 2024 (exact date).  She reports going to  for the ovarian cancer screening US on 8/3/24. She was told the right ovary is larger than the other, and it could have been due to her recent menstrual cycle. She was advised to follow up with repeat US in 3 months, but wanted a second opinion. Since then she has had right adnexal \"twinges\" and was worried.     The following portions of the patient's history were reviewed and updated as appropriate:current medications, allergies, and past medical history    Social History    Tobacco Use      Smoking status: Never      Smokeless tobacco: Never         Objective   /80 (BP Location: Left arm, Patient Position: Sitting, Cuff Size: Adult)   Pulse 72   Ht 165.1 cm (65\")   Wt 68.1 kg (150 lb 3.2 oz)   LMP 2024 (Exact Date)   Breastfeeding No   BMI 24.99 kg/m²     General:  well developed; well nourished  no acute distress   Lungs:  breathing is unlabored   Heart:  Not performed.     Lab Review   No data reviewed    Imaging   Pelvic ultrasound report        Assessment   Abnormal ovarian cancer screening      Plan   As this provider is unable to see the ultrasound results or images from , repeat ultrasound performed today.  Pelvic anatomy overall normal with no adnexal pathology appreciated.  Reassurance provided.   The importance of keeping all planned follow-up and taking all medications as prescribed was emphasized.  Follow up for annual exam or sooner PRN    No orders of the defined types were placed in this encounter.         This note was electronically signed.    Tiffany Alonso MD   2024     "

## 2024-08-22 ENCOUNTER — PREP FOR SURGERY (OUTPATIENT)
Dept: OTHER | Facility: HOSPITAL | Age: 54
End: 2024-08-22
Payer: COMMERCIAL

## 2024-08-22 ENCOUNTER — OFFICE VISIT (OUTPATIENT)
Dept: OBSTETRICS AND GYNECOLOGY | Facility: CLINIC | Age: 54
End: 2024-08-22
Payer: COMMERCIAL

## 2024-08-22 VITALS
HEIGHT: 65 IN | HEART RATE: 72 BPM | WEIGHT: 150.2 LBS | SYSTOLIC BLOOD PRESSURE: 104 MMHG | BODY MASS INDEX: 25.02 KG/M2 | DIASTOLIC BLOOD PRESSURE: 80 MMHG

## 2024-08-22 DIAGNOSIS — R10.2 RIGHT ADNEXAL TENDERNESS: Primary | ICD-10-CM

## 2024-08-22 PROCEDURE — 99213 OFFICE O/P EST LOW 20 MIN: CPT | Performed by: STUDENT IN AN ORGANIZED HEALTH CARE EDUCATION/TRAINING PROGRAM

## 2024-10-21 ENCOUNTER — TRANSCRIBE ORDERS (OUTPATIENT)
Dept: ADMINISTRATIVE | Facility: HOSPITAL | Age: 54
End: 2024-10-21
Payer: COMMERCIAL

## 2024-10-21 DIAGNOSIS — Z12.31 VISIT FOR SCREENING MAMMOGRAM: Primary | ICD-10-CM

## 2024-11-11 LAB
NCCN CRITERIA FLAG: NORMAL
TYRER CUZICK SCORE: 10.3

## 2024-11-26 ENCOUNTER — HOSPITAL ENCOUNTER (OUTPATIENT)
Dept: MAMMOGRAPHY | Facility: HOSPITAL | Age: 54
Discharge: HOME | End: 2024-11-26
Admitting: STUDENT IN AN ORGANIZED HEALTH CARE EDUCATION/TRAINING PROGRAM
Payer: COMMERCIAL

## 2024-11-26 DIAGNOSIS — Z12.31 VISIT FOR SCREENING MAMMOGRAM: ICD-10-CM

## 2024-11-26 PROCEDURE — 77063 BREAST TOMOSYNTHESIS BI: CPT

## 2024-11-26 PROCEDURE — 77067 SCR MAMMO BI INCL CAD: CPT

## 2025-02-06 NOTE — PROGRESS NOTES
"Subjective   Chief Complaint   Patient presents with    Gynecologic Exam     Annual,  pt states that her last period was in 2024.  Pt c/o hot flashes at night.     Nellie Camacho is a 54 y.o. year old  menopausal female presenting to be seen for her annual exam.  She is doing well and reports her last period was in September. She is now experiencing night sweats. She states it is not particularly bothersome, and will continue lifestyle changes for management.     She is using tubal sterilization for contraception.     This past year she has not been on hormone replacement therapy.      Last pap 2022: NILM, HPV negative   Cologuard due 2025   Up-to-date on mammogram    SEXUAL Hx:  She is currently sexually active.  In the past year there there has been NO new sexual partners.    She would not like to be screened for STD's at today's exam.  [place] is painful: no  HEALTH Hx:  She exercises regularly: yes. 5-6x a week, cardio and pickleball!   She wears her seat belt: yes.  She has concerns about domestic violence: no.    The following portions of the patient's history were reviewed and updated as appropriate:problem list, current medications, allergies, past family history, past medical history, past social history, and past surgical history.    Social History    Tobacco Use      Smoking status: Never      Smokeless tobacco: Never         Objective   /80 (BP Location: Right arm, Patient Position: Sitting, Cuff Size: Adult)   Ht 165.1 cm (65\")   Wt 68.7 kg (151 lb 6.4 oz)   LMP 2024 (Exact Date)   Breastfeeding No   BMI 25.19 kg/m²     General:  well developed; well nourished  no acute distress  mentation appropriate   Breasts:  Examined in supine position  Symmetric without masses or skin dimpling  Nipples normal without inversion, lesions or discharge  There are no palpable axillary nodes   Pelvis: Clinical staff was present for exam  External genitalia:  normal appearance " of the external genitalia including Barth[place]'s and [place]'s glands.  :  urethral meatus normal; urethra normal:  Vaginal:  normal pink mucosa without prolapse or lesions.  Cervix:  normal appearance.  Uterus:  normal size, shape and consistency.  Adnexa:  normal bimanual exam of the adnexa.  Rectal:  digital rectal exam not performed; anus visually normal appearing.        Assessment   Annual GNY exam  Perimenopausal   Menopausal female currently not on HRT - without significant symptoms affecting activities of daily living  She is up to date on all relevant gynecologic and colorectal screenings     Plan   Pap was not done today.  I explained to Nellie that the recommendations for Pap smear interval in a low risk patient has lengthened to 5 years time.  I told Nellie she still needs to be seen in our office yearly for a full physical including breast and pelvic exam.   She was encouraged to get yearly mammograms.  She should report any palpable breast lump(s) or skin changes regardless of mammographic findings.  I explained to Nellie that notification regarding her mammogram results will come from the center performing the study.  Our office will not be routinely calling with mammogram results.  It is her responsibility to make sure that the results from the mammogram are communicated to her by the breast center.  If she has any questions about the results, she is welcome to call our office anytime.  Discussed hormonal and non hormonal options for VMS control should patient's symptoms worsen in the future.   The importance of keeping all planned follow-up and taking all medications as prescribed was emphasized.  Follow up for annual exam in 1 year or sooner PRN     No orders of the defined types were placed in this encounter.         This note was electronically signed.    Tiffany Alonso MD  February 7, 2025

## 2025-02-07 ENCOUNTER — OFFICE VISIT (OUTPATIENT)
Dept: OBSTETRICS AND GYNECOLOGY | Facility: CLINIC | Age: 55
End: 2025-02-07
Payer: COMMERCIAL

## 2025-02-07 VITALS
SYSTOLIC BLOOD PRESSURE: 104 MMHG | HEIGHT: 65 IN | DIASTOLIC BLOOD PRESSURE: 80 MMHG | BODY MASS INDEX: 25.22 KG/M2 | WEIGHT: 151.4 LBS

## 2025-02-07 DIAGNOSIS — N95.1 PERIMENOPAUSAL: ICD-10-CM

## 2025-02-07 DIAGNOSIS — Z01.419 WOMEN'S ANNUAL ROUTINE GYNECOLOGICAL EXAMINATION: Primary | ICD-10-CM

## 2025-02-07 RX ORDER — ALBUTEROL SULFATE 90 UG/1
1 INHALANT RESPIRATORY (INHALATION)
COMMUNITY
Start: 2024-10-31